# Patient Record
Sex: MALE | Race: WHITE | Employment: FULL TIME | URBAN - METROPOLITAN AREA
[De-identification: names, ages, dates, MRNs, and addresses within clinical notes are randomized per-mention and may not be internally consistent; named-entity substitution may affect disease eponyms.]

---

## 2017-05-19 PROBLEM — M47.816 LUMBAR SPONDYLOSIS: Status: ACTIVE | Noted: 2017-05-19

## 2017-11-08 ENCOUNTER — TELEPHONE (OUTPATIENT)
Dept: FAMILY MEDICINE CLINIC | Facility: CLINIC | Age: 47
End: 2017-11-08

## 2018-12-28 ENCOUNTER — HOSPITAL ENCOUNTER (OUTPATIENT)
Age: 48
Discharge: HOME | End: 2018-12-28
Payer: COMMERCIAL

## 2018-12-28 VITALS
TEMPERATURE: 98 F | SYSTOLIC BLOOD PRESSURE: 138 MMHG | OXYGEN SATURATION: 100 % | RESPIRATION RATE: 18 BRPM | DIASTOLIC BLOOD PRESSURE: 88 MMHG | HEART RATE: 92 BPM

## 2018-12-28 DIAGNOSIS — K12.2 UVULITIS: Primary | ICD-10-CM

## 2018-12-28 DIAGNOSIS — J03.01 ACUTE RECURRENT STREPTOCOCCAL TONSILLITIS: ICD-10-CM

## 2018-12-28 LAB — POCT RAPID STREP: POSITIVE

## 2018-12-28 PROCEDURE — 99213 OFFICE O/P EST LOW 20 MIN: CPT

## 2018-12-28 PROCEDURE — 87430 STREP A AG IA: CPT

## 2018-12-28 PROCEDURE — 99204 OFFICE O/P NEW MOD 45 MIN: CPT

## 2018-12-28 RX ORDER — METHYLPREDNISOLONE 4 MG/1
TABLET ORAL
Qty: 1 PACKAGE | Refills: 0 | Status: SHIPPED | OUTPATIENT
Start: 2018-12-28

## 2018-12-28 RX ORDER — DEXAMETHASONE SODIUM PHOSPHATE 4 MG/ML
10 VIAL (ML) INJECTION ONCE
Status: COMPLETED | OUTPATIENT
Start: 2018-12-28 | End: 2018-12-28

## 2018-12-28 RX ORDER — AMOXICILLIN AND CLAVULANATE POTASSIUM 875; 125 MG/1; MG/1
1 TABLET, FILM COATED ORAL 2 TIMES DAILY
Qty: 20 TABLET | Refills: 0 | Status: SHIPPED | OUTPATIENT
Start: 2018-12-28 | End: 2019-01-07

## 2018-12-28 NOTE — ED PROVIDER NOTES
Patient Seen in: THE [facility]    History   Patient presents with:  Sore Throat    Stated Complaint: possible strep throat     HPI    Josue Ish is a 44-year-old male who states that on Reji day he finished a 10-day course of pen are moist, pink, and intact; teeth intact.  Moderate erythema, no exudates, tonsillar hypertrophy, uvula midline and edematous, no trismus or drooling   Neck: Supple; +anterior cervical adenopathy   Lungs: Clear to auscultation bilaterally, respirations unl conditions. I've explained the importance of following up with his doctor- Mayra Elias MD  - as instructed. The patient verbalized understanding of the discharge instructions and plan.

## 2021-11-23 ENCOUNTER — HOSPITAL ENCOUNTER (OUTPATIENT)
Age: 51
Discharge: HOME | End: 2021-11-23
Payer: COMMERCIAL

## 2021-11-23 VITALS
OXYGEN SATURATION: 96 % | HEIGHT: 72 IN | HEART RATE: 94 BPM | TEMPERATURE: 97 F | DIASTOLIC BLOOD PRESSURE: 89 MMHG | WEIGHT: 225 LBS | SYSTOLIC BLOOD PRESSURE: 143 MMHG | BODY MASS INDEX: 30.48 KG/M2 | RESPIRATION RATE: 16 BRPM

## 2021-11-23 DIAGNOSIS — Z20.822 ENCOUNTER FOR LABORATORY TESTING FOR COVID-19 VIRUS: Primary | ICD-10-CM

## 2021-11-23 PROCEDURE — 99212 OFFICE O/P EST SF 10 MIN: CPT

## 2021-11-23 NOTE — ED PROVIDER NOTES
Patient Seen in: [facility]      History   Patient presents with:  Testing    Stated Complaint: covid exposure    Subjective: This is a 62-year-old male with below stated medical history. Presents to immediate care for Covid–19 testing. negative except as noted above.     Physical Exam     ED Triage Vitals   BP 11/23/21 1328 143/89   Pulse 11/23/21 1328 94   Resp 11/23/21 1326 16   Temp 11/23/21 1326 97.2 °F (36.2 °C)   Temp src 11/23/21 1326 Temporal   SpO2 11/23/21 1328 96 %   O2 Device Psychiatric:         Mood and Affect: Mood normal.         Behavior: Behavior normal.               ED Course     Labs Reviewed   RAPID SARS-COV-2 BY PCR - Normal          Rapid covid. MDM      Vital signs stable.   Patient is well-appearing and no

## 2022-04-27 ENCOUNTER — HOSPITAL ENCOUNTER (OUTPATIENT)
Age: 52
Discharge: HOME | End: 2022-04-27
Payer: COMMERCIAL

## 2022-04-27 VITALS
HEIGHT: 72 IN | TEMPERATURE: 99 F | DIASTOLIC BLOOD PRESSURE: 96 MMHG | WEIGHT: 235 LBS | HEART RATE: 78 BPM | OXYGEN SATURATION: 97 % | SYSTOLIC BLOOD PRESSURE: 135 MMHG | RESPIRATION RATE: 16 BRPM | BODY MASS INDEX: 31.83 KG/M2

## 2022-04-27 DIAGNOSIS — U07.1 COVID-19 VIRUS INFECTION: Primary | ICD-10-CM

## 2022-04-27 LAB — SARS-COV-2 RNA RESP QL NAA+PROBE: DETECTED

## 2022-04-27 PROCEDURE — 99214 OFFICE O/P EST MOD 30 MIN: CPT

## 2022-04-27 RX ORDER — IBUPROFEN 600 MG/1
600 TABLET ORAL ONCE
Status: COMPLETED | OUTPATIENT
Start: 2022-04-27 | End: 2022-04-27

## 2022-04-27 RX ORDER — BEBTELOVIMAB 87.5 MG/ML
175 INJECTION, SOLUTION INTRAVENOUS ONCE
Status: COMPLETED | OUTPATIENT
Start: 2022-04-27 | End: 2022-04-27

## 2022-04-27 RX ORDER — BENZONATATE 100 MG/1
100 CAPSULE ORAL 3 TIMES DAILY PRN
Qty: 30 CAPSULE | Refills: 0 | Status: SHIPPED | OUTPATIENT
Start: 2022-04-27 | End: 2022-05-27

## 2022-04-27 NOTE — ED INITIAL ASSESSMENT (HPI)
Pt began last night with sore throat, and was chilled during the night.   Now has a scratchy throat, congestion and back pain

## 2022-04-28 ENCOUNTER — TELEPHONE (OUTPATIENT)
Dept: CASE MANAGEMENT | Age: 52
End: 2022-04-28

## 2022-04-28 NOTE — TELEPHONE ENCOUNTER
Noted that the last time that patient was seen in the office was on 5/13/2016. Called the patient. Asked the patient if Dr. Curtis Noyola is still PCP. Patient states that Dr. Curtis Noyola is not his PCP. PCP is Dr. Ofelia Gonzalez. Patient states that he was asked if he wanted the information to go to his PCP, he did not ask the name. He assumed PCP was listed as Dr. Ofelia Gonzalez. PCP changed in Epic.

## 2022-04-28 NOTE — TELEPHONE ENCOUNTER
Pt received MAB infusion at [address] [place] [address] on 4/27/22 for COVID-19. Please follow-up with pt for post-infusion assessment and home monitoring if needed. Thank you.

## 2022-12-20 ENCOUNTER — HOSPITAL ENCOUNTER (OUTPATIENT)
Age: 52
Discharge: HOME | End: 2022-12-20
Attending: EMERGENCY MEDICINE
Payer: COMMERCIAL

## 2022-12-20 VITALS
TEMPERATURE: 98 F | RESPIRATION RATE: 18 BRPM | WEIGHT: 240 LBS | BODY MASS INDEX: 33 KG/M2 | SYSTOLIC BLOOD PRESSURE: 159 MMHG | HEART RATE: 87 BPM | DIASTOLIC BLOOD PRESSURE: 88 MMHG | OXYGEN SATURATION: 99 %

## 2022-12-20 DIAGNOSIS — M54.50 ACUTE LEFT-SIDED LOW BACK PAIN WITHOUT SCIATICA: Primary | ICD-10-CM

## 2022-12-20 PROCEDURE — 99213 OFFICE O/P EST LOW 20 MIN: CPT

## 2022-12-20 RX ORDER — IBUPROFEN 400 MG/1
400 TABLET ORAL EVERY 6 HOURS PRN
COMMUNITY

## 2022-12-20 RX ORDER — CYCLOBENZAPRINE HCL 10 MG
10 TABLET ORAL 3 TIMES DAILY PRN
Qty: 20 TABLET | Refills: 0 | Status: SHIPPED | OUTPATIENT
Start: 2022-12-20 | End: 2022-12-27

## 2022-12-20 RX ORDER — PREDNISONE 20 MG/1
60 TABLET ORAL DAILY
Qty: 12 TABLET | Refills: 0 | Status: SHIPPED | OUTPATIENT
Start: 2022-12-20 | End: 2022-12-24

## 2022-12-20 RX ORDER — PREDNISONE 20 MG/1
60 TABLET ORAL ONCE
Status: COMPLETED | OUTPATIENT
Start: 2022-12-20 | End: 2022-12-20

## 2024-06-26 ENCOUNTER — OCC HEALTH (OUTPATIENT)
Dept: OCCUPATIONAL MEDICINE | Age: 54
End: 2024-06-26
Attending: FAMILY MEDICINE

## 2024-06-26 ENCOUNTER — HOSPITAL ENCOUNTER (OUTPATIENT)
Dept: GENERAL RADIOLOGY | Age: 54
Discharge: HOME | End: 2024-06-26
Attending: FAMILY MEDICINE

## 2024-06-26 DIAGNOSIS — M54.2 NECK PAIN: Primary | ICD-10-CM

## 2024-06-26 DIAGNOSIS — M54.2 NECK PAIN: ICD-10-CM

## 2024-06-26 PROCEDURE — 72050 X-RAY EXAM NECK SPINE 4/5VWS: CPT | Performed by: FAMILY MEDICINE

## 2024-06-27 ENCOUNTER — OFFICE VISIT (OUTPATIENT)
Dept: OCCUPATIONAL MEDICINE | Age: 54
End: 2024-06-27
Attending: PHYSICIAN ASSISTANT

## 2024-06-27 DIAGNOSIS — S00.93XA HEAD CONTUSION: Primary | ICD-10-CM

## 2024-06-27 DIAGNOSIS — S16.1XXD CERVICAL STRAIN, ACUTE, SUBSEQUENT ENCOUNTER: ICD-10-CM

## 2024-06-27 DIAGNOSIS — S00.81XD: ICD-10-CM

## 2024-07-03 ENCOUNTER — OFFICE VISIT (OUTPATIENT)
Dept: OCCUPATIONAL MEDICINE | Age: 54
End: 2024-07-03
Attending: PHYSICIAN ASSISTANT

## 2024-07-03 DIAGNOSIS — S00.93XD CONTUSION OF HEAD, SUBSEQUENT ENCOUNTER: Primary | ICD-10-CM

## 2024-07-03 DIAGNOSIS — S00.81XD: ICD-10-CM

## 2024-07-03 DIAGNOSIS — S16.1XXD CERVICAL STRAIN, ACUTE, SUBSEQUENT ENCOUNTER: ICD-10-CM

## 2025-03-15 ENCOUNTER — OFFICE VISIT (OUTPATIENT)
Dept: FAMILY MEDICINE CLINIC | Facility: CLINIC | Age: 55
End: 2025-03-15
Payer: COMMERCIAL

## 2025-03-15 VITALS
BODY MASS INDEX: 31.56 KG/M2 | OXYGEN SATURATION: 97 % | SYSTOLIC BLOOD PRESSURE: 129 MMHG | DIASTOLIC BLOOD PRESSURE: 90 MMHG | WEIGHT: 233 LBS | HEART RATE: 82 BPM | RESPIRATION RATE: 16 BRPM | HEIGHT: 72 IN | TEMPERATURE: 98 F

## 2025-03-15 DIAGNOSIS — J03.90 EXUDATIVE TONSILLITIS: Primary | ICD-10-CM

## 2025-03-15 DIAGNOSIS — J02.9 SORE THROAT: ICD-10-CM

## 2025-03-15 LAB
CONTROL LINE PRESENT WITH A CLEAR BACKGROUND (YES/NO): YES YES/NO
KIT LOT #: NORMAL NUMERIC

## 2025-03-15 PROCEDURE — 87081 CULTURE SCREEN ONLY: CPT | Performed by: PHYSICIAN ASSISTANT

## 2025-03-15 PROCEDURE — 87077 CULTURE AEROBIC IDENTIFY: CPT | Performed by: PHYSICIAN ASSISTANT

## 2025-03-15 RX ORDER — CEPHALEXIN 500 MG/1
500 CAPSULE ORAL 2 TIMES DAILY
Qty: 20 CAPSULE | Refills: 0 | Status: SHIPPED | OUTPATIENT
Start: 2025-03-15 | End: 2025-03-25

## 2025-03-15 NOTE — PROGRESS NOTES
CHIEF COMPLAINT:     Chief Complaint   Patient presents with    Sinus Problem     5 days, body aches, fatigue, sore throat, denies fever, OTC tylenol          HPI:   Ward Adler Jr. is a 54 year old male presents to clinic with complaint of sore throat. Patient has had 5 days. Symptoms have been worsening over course.  Initial onset with body aches, malaise/fatigue.   Malaise/fatigue improved, however sore throat has been worsening over course.  OTC APAP without relief.  Denies nasal congestion, no rhinorrhea no cough, no n/v/d, no rash.   No confirmed strep exposure, has 12 and 15 yo daughters at home.         Current Outpatient Medications   Medication Sig Dispense Refill    cephALEXin 500 MG Oral Cap Take 1 capsule (500 mg total) by mouth 2 (two) times daily for 10 days. 20 capsule 0    ibuprofen 400 MG Oral Tab Take 1 tablet (400 mg total) by mouth every 6 (six) hours as needed for Pain.      methylPREDNISolone (MEDROL) 4 MG Oral Tablet Therapy Pack Dosepack: take as directed (Patient not taking: Reported on 11/23/2021) 1 Package 0    methylPREDNISolone 4 MG Oral Tab Instructions per medrol dose pack (Patient not taking: Reported on 11/23/2021) 21 tablet 0    TOPICORT SPRAY 0.25 % External Liquid  (Patient not taking: Reported on 11/23/2021)      Ketoconazole 2 % External Shampoo  (Patient not taking: Reported on 11/23/2021)  2    KERALAC 47 % External Cream  (Patient not taking: Reported on 11/23/2021)  2    Adalimumab 40 MG/0.8ML Subcutaneous Prefilled Syringe Kit Inject 40 mg into the skin every 14 (fourteen) days.        Past Medical History:    Diarrhea, unspecified    Easy bruising    Flatulence/gas pain/belching    Psoriasis      Social History:  Social History     Socioeconomic History    Marital status:    Tobacco Use    Smoking status: Never    Smokeless tobacco: Never   Vaping Use    Vaping status: Never Used   Substance and Sexual Activity    Alcohol use: Yes     Comment: 5-6 drinks per  wk    Drug use: No        REVIEW OF SYSTEMS:   GENERAL HEALTH: normal appetite  SKIN: Per HPI  HEENT: denies ear pain, See HPI  RESPIRATORY: denies shortness of breath or wheezing  CARDIOVASCULAR: denies chest pain or palpitations   GI: denies vomiting or diarrhea  NEURO: denies dizziness or lightheadedness    EXAM:   /90   Pulse 82   Temp 98 °F (36.7 °C)   Resp 16   Ht 6' (1.829 m)   Wt 233 lb (105.7 kg)   SpO2 97%   BMI 31.60 kg/m²   GENERAL: well developed, well nourished,in no apparent distress  SKIN: no rashes,no suspicious lesions  HEAD: atraumatic, normocephalic  EYES: conjunctiva clear, EOM intact  EARS: TM's clear, non-injected, no bulging, retraction, or fluid bilaterally  NOSE: nostrils patent, clear nasal discharge, nasal mucosa pink/moist  THROAT: oral mucosa pink, moist. Posterior pharynx markedly erythematous and injected. (+) exudates. Tonsils 2+/4.  Breath non malodorous   NECK: supple, submandibular LAD, no posterior cervical LAD, trachea midline, no stridor.  LUNGS: clear to auscultation bilaterally, no wheezes or rhonchi. Breathing is non labored.  CARDIO: RRR without murmur  EXTREMITIES: no cyanosis, clubbing or edema    Recent Results (from the past 24 hours)   Strep A Assay W/Optic    Collection Time: 03/15/25 11:07 AM   Result Value Ref Range    Strep Grp A Screen neg Negative    Control Line Present with a clear background (yes/no) yes Yes/No    Kit Lot # [id number] Numeric    Kit Expiration Date 12/20/25 Date         ASSESSMENT AND PLAN:   Assessment:   Encounter Diagnoses   Name Primary?    Sore throat     Exudative tonsillitis Yes       Plan:    Rapid strep negative, throat cx pending.    Keflex per epic given HPI and exam.  Sx management reviewed, ddx including EBV/CMV discussed, f/u with PCP if symptoms fail to improve and resolve as anticipated.      Comfort measures explained and discussed as listed in Patient Instructions    Follow up with PCP in 3-5 days if not improving,  condition worsens, or fever greater than or equal to 100.4 persists for 72 hours.    The patient/parent indicates understanding of these issues and agrees to the plan.    There are no Patient Instructions on file for this visit.

## 2025-03-15 NOTE — PATIENT INSTRUCTIONS
Rapid strep negative, throat culture pending.     Keflex 500 mg twice daily for 10 days for suspected strep throat species (?non group A strep) with persistent/worsening symptoms       Encourage fluids, humidifier/vaporizor at bedside, elevate head of bed (sleep with extra pillow), vapor rub to chest, steam therapy if no fever, warm compresses for sinus pressure if no fever, salt water gargles for sore throat, lozenges for sore throat, may try over the counter saline nasal spray or irrigation kit (use distilled water with irrigation kit) for sinus pressure/congestion, get plenty of rest.        Follow up with your primary care provider if your symptoms fail to improve and resolve as anticipated    Go to the Immediate Care or Emergency Department in event of new or worsening symptoms at any time